# Patient Record
Sex: MALE | Race: WHITE | NOT HISPANIC OR LATINO
[De-identification: names, ages, dates, MRNs, and addresses within clinical notes are randomized per-mention and may not be internally consistent; named-entity substitution may affect disease eponyms.]

---

## 2020-02-19 PROBLEM — Z00.00 ENCOUNTER FOR PREVENTIVE HEALTH EXAMINATION: Status: ACTIVE | Noted: 2020-02-19

## 2020-02-24 ENCOUNTER — APPOINTMENT (OUTPATIENT)
Dept: RADIATION ONCOLOGY | Facility: CLINIC | Age: 75
End: 2020-02-24
Payer: MEDICARE

## 2020-02-24 VITALS
HEART RATE: 70 BPM | HEIGHT: 68 IN | OXYGEN SATURATION: 99 % | SYSTOLIC BLOOD PRESSURE: 140 MMHG | TEMPERATURE: 98 F | DIASTOLIC BLOOD PRESSURE: 78 MMHG | WEIGHT: 214 LBS | BODY MASS INDEX: 32.43 KG/M2 | RESPIRATION RATE: 12 BRPM

## 2020-02-24 DIAGNOSIS — Z80.41 FAMILY HISTORY OF MALIGNANT NEOPLASM OF OVARY: ICD-10-CM

## 2020-02-24 DIAGNOSIS — Z85.841 PERSONAL HISTORY OF MALIGNANT NEOPLASM OF BRAIN: ICD-10-CM

## 2020-02-24 DIAGNOSIS — I97.110: ICD-10-CM

## 2020-02-24 DIAGNOSIS — Z78.9 OTHER SPECIFIED HEALTH STATUS: ICD-10-CM

## 2020-02-24 DIAGNOSIS — Z87.438 PERSONAL HISTORY OF OTHER DISEASES OF MALE GENITAL ORGANS: ICD-10-CM

## 2020-02-24 DIAGNOSIS — Z87.891 PERSONAL HISTORY OF NICOTINE DEPENDENCE: ICD-10-CM

## 2020-02-24 DIAGNOSIS — Z87.11 PERSONAL HISTORY OF PEPTIC ULCER DISEASE: ICD-10-CM

## 2020-02-24 PROCEDURE — 99204 OFFICE O/P NEW MOD 45 MIN: CPT | Mod: 25,GC

## 2020-02-24 RX ORDER — TAMSULOSIN HYDROCHLORIDE 0.4 MG/1
0.4 CAPSULE ORAL
Refills: 0 | Status: ACTIVE | COMMUNITY

## 2020-02-24 NOTE — PHYSICAL EXAM
[Normal] : normal skin color and pigmentation and no rash [de-identified] : cranial nerves intact except for bilateral hearing loss, tremors bilaterally L>R, some instability with walking

## 2020-02-24 NOTE — VITALS
[Maximal Pain Intensity: 0/10] : 0/10 [Least Pain Intensity: 0/10] : 0/10 [NoTreatment Scheduled] : no treatment scheduled [80: Normal activity with effort; some signs or symptoms of disease.] : 80: Normal activity with effort; some signs or symptoms of disease.  [80: Active, but tires more quickly] : 80: Active, but tires more quickly [ECOG Performance Status: 2 - Ambulatory and capable of all self care but unable to carry out any work activities] : Performance Status: 2 - Ambulatory and capable of all self care but unable to carry out any work activities. Up and about more than 50% of waking hours [Date: ____________] : Patient's last distress assessment performed on [unfilled]. [0 - No Distress] : Distress Level: 0

## 2020-02-24 NOTE — HISTORY OF PRESENT ILLNESS
[FreeTextEntry1] : Mr. 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 Nir Swanson and Dr. Brito- [facility]\par \par 3/23/17- 2nd craniotomy to relieve pressure ans swelling from the first craniotomy\par \par 6/29/17- Dr. Cooney consult\par  metastatic renal cell carcinoma to brain. He is status post prior craniotomy in March 2017 and\par has opted to continue with observation to the tumor bed in lieu of radiation\par therapy to reduce his risks of recurrence in this location. We have briefly\par discussed the role of hypofractionated radiation therapy to encompass the\par cavity to maximize local control in this area. They understand that the benefit\par would be for local control only; however, as of yet there is no survival\par benefit by adding radiation therapy at this time. They favor continued observation. \par We have also discussed the role of gamma knife radiosurgery targeting his 8.0 mm \par right cerebellar lesion.\par \par 7/20/17- Gamma knife #1- Dr. Cooney & Dr. Thompson \par Using the gamma plan software, the optimal isodose\par plan and configuration was established for the treatment of the right\par cerebellar metastasis that was confined to the tonsil. Careful attention was\par paid to avoiding any radiation to the adjacent brainstem. Upon approval of the\par plan by the radiation oncologist, DARÍO CONOEY MD, the patient was brought into\par the treatment area where he underwent the gamma knife stereotactic\par radiosurgical intervention in an uneventful fashion. A prescription dose of 20\par Gy was delivered to the edge of the metastasis coincident with the 50% isodose\par line. \par \par 1/18/18- Dr. Cooney - pt presents with follow-up MRI of the brain revealing a new left sided cerebellar lesion\par \par 2/1/18- Gamma knife #2- Dr. Streeter & Dr. Thompson\elvira n the meantime the data was analyzed in the dosimetry room where the left\par cerebellar metastasis was identified. A treatment plan was then developed to\par perform a highly conformal outline of the tumor using a combination of an 8 mm\par and 4 mm collimator helmet with different gamma angles. Upon approval of the\par plan by the radiation oncologist, ROLLY STREETER M.D., the patient was brought\par into the treatment area where he underwent the gamma knife stereotactic\par radiosurgical intervention in an uneventful fashion. A prescription dose of 20\par Gy was delivered to the 50 percent isodose line at the edge of the tumor. \par \par 5/18/18- Dr. Cooney- He has had 2 prior excellent responses to Gamma Knife radiosurgery and\par he now presents with a small 1 cm tumor deposit at the superior aspect of his\par right temporal craniotomy bed. \par \par 5/31/18- Gamma knife #3- Dr. Cooney & Dr. Thompson\par Using the GammaPlan software, the optimal isodose\par plan and configuration was utilized to create a complex series of shots to\par cover the right temporoparietal metastasis, 16 mm, two 8 mm, and two 4 mm shots\par being utilized.\par On approval of the plan by the radiation oncologist, DARÍO COONEY MD, the\par patient was brought into the treatment area where he underwent the Gamma Knife\par stereotactic radiosurgical intervention in an uneventful fashion. A\par prescription dose of 20 Gy was delivered to the tumor coincident with a 50\par percent isodose line placed immediately outside the edge of the\par contrast-enhancing area noted on the MRI scan.\par \par 12/14/18- Dr. Thompson f/u- Stated the 3 lesions remain very similar in size, a small amount of peritumoral edema being seen in the lesion that is adjacent to the fourth ventricle. HE stated he saw no evidence of any new lesions within the brain. \par \par 1/2/2020- Dr. Dave Silver- Pt had left partial adrenalectomy- at [facility] in [place] \par \par Dr. Elizabeth Ayon Neuro oncologist was following the pt for a while, Dr. Ayon left the practice and Dr. Nedra Del Toro- Neuro oncologist- at [facility] at the [facility]. MRI continued q 3 months, no new results/lesions up until 2/2020. \par \par \par MRI - 2/11/20- Unchanged right temporoparietal resection cavity ans adjacent edema/gliosis. Nodular enhancement along the superior margin is unchanged at 1.5 x 0.8 cm.\par Unchanged left cerebellar enhancing lesions measuring up to 0.5 x 0.5 cm\par Unchanged inferior right cerebellar enhancing lesion 0.7 x 0.7cm\par Increased left cerebellar lesion at 0.8 x 0.6 cm previously 0.4 x 0.2 cm. \par \par Hemianopsia- visual field cut bilateral eyes on the left eyes\par \par Has some hearing loss bilaterally and tremors but otherwise feels well since his surgery last month.\par \par Global fatigue score - 0\par Fatigue interference score -0\par Fatigue severity- 0

## 2020-02-24 NOTE — REASON FOR VISIT
[Consideration for Non-Curative Therapy] : consideration for non-curative therapy for [Brain Metastasis] : brain metastasis [Spouse] : spouse

## 2020-02-24 NOTE — REVIEW OF SYSTEMS
[Urinary Frequency: Grade 1 - Present] : Urinary Frequency: Grade 1 - Present [Blurred Vision: Grade 0] : Blurred Vision: Grade 0 [Tinnitus: Grade 1 - Mild symptoms; intervention not indicated] : Tinnitus: Grade 1 - Mild symptoms; intervention not indicated [Xerostomia: Grade 0] : Xerostomia: Grade 0 [Mucositis Oral: Grade 0] : Mucositis Oral: Grade 0  [Salivary duct inflammation: Grade 0] : Salivary duct inflammation: Grade 0 [Oral Pain: Grade 0] : Oral Pain: Grade 0 [Dysgeusia: Grade 0] : Dysgeusia: Grade 0 [Concentration Impairment: Grade 1] : Concentration Impairment: Grade 1 - Mild inattention or decreased level of concentration [Cognitive Disturbance: Grade 1 - Mild cognitive disability; not interfering with work/school/life performance; specialized educational services/devices not indicated] : Cognitive Disturbance: Grade 1 - Mild cognitive disability; not interfering with work/school/life performance; specialized educational services/devices not indicated [Facial Muscle Weakness: Grade 0] : Facial Muscle Weakness: Grade 0 [Dizziness: Grade 0] : Dizziness: Grade 0  [Headache: Grade 0] : Headache: Grade 0 [Lethargy: Grade 0] : Lethargy: Grade 0 [Peripheral Motor Neuropathy: Grade 0] : Peripheral Motor Neuropathy: Grade 0 [Peripheral Sensory Neuropathy: Grade 0] : Peripheral Sensory Neuropathy: Grade 0 [Insomnia: Grade 0] : Insomnia: Grade 0 [Anxiety: Grade 0] : Anxiety: Grade 0  [Depression: Grade 0] : Depression: Grade 0 [Negative] : Heme/Lymph [Fatigue: Grade 0] : Fatigue: Grade 0 [FreeTextEntry3] : hemianopsia [de-identified] : tremors, imbalance [FreeTextEntry9] : nocturia x1 [FreeTextEntry1] : occasional  long term memory loss,  [FreeTextEntry2] : occasional unsteady gait, \par Hemianopsia- visual field cut bilateral eyes on the left eyes

## 2020-06-04 ENCOUNTER — APPOINTMENT (OUTPATIENT)
Dept: RADIATION ONCOLOGY | Facility: CLINIC | Age: 75
End: 2020-06-04
Payer: MEDICARE

## 2020-06-04 VITALS
HEART RATE: 67 BPM | OXYGEN SATURATION: 98 % | HEIGHT: 68 IN | TEMPERATURE: 97 F | DIASTOLIC BLOOD PRESSURE: 74 MMHG | RESPIRATION RATE: 14 BRPM | BODY MASS INDEX: 32.58 KG/M2 | WEIGHT: 215 LBS | SYSTOLIC BLOOD PRESSURE: 138 MMHG

## 2020-06-04 PROCEDURE — 99024 POSTOP FOLLOW-UP VISIT: CPT

## 2020-06-04 RX ORDER — DEXAMETHASONE 4 MG/1
4 TABLET ORAL DAILY
Qty: 4 | Refills: 0 | Status: DISCONTINUED | COMMUNITY
Start: 2020-02-27 | End: 2020-06-04

## 2020-06-04 NOTE — HISTORY OF PRESENT ILLNESS
[FreeTextEntry1] : Mr. Martines is a 75-year-old  male with metastatic renal cell carcinoma, status post three gamma knife procedures over\par the last two years, who presented with a new lesion involving medial inferior left cerebellar region and was referred for stereotactic radiosurgery. \par \par 2/27/20 -  Gamma knife - Dr. Bates & Dr. Thompson- A dose of 24 Gy was prescribed to the 50% isodose\par line to the lesion located in the left medial inferior cerebellar region.\par \par 6/4/20- Follow up post gamma knife, MRI was done here today at [facility]. Pt states he is feeling well. C/O occasionally bilateral tinnitus, [place] bilaterally- wears hearing aide bilaterally- unchanged. Occasional unsteady gait, walking without assistive device, not driving. C/O long term memory loss, unchanged. \par Denies headache nausea or vomiting.\par \par Patient was seen recently at [facility].  Patient had surgery for his metastatic disease in his adrenal gland.  Patient is being reviewed for systemic chemotherapy.\par \par

## 2020-06-04 NOTE — VITALS
[Least Pain Intensity: 0/10] : 0/10 [Maximal Pain Intensity: 0/10] : 0/10 [80: Active, but tires more quickly] : 80: Active, but tires more quickly [80: Normal activity with effort; some signs or symptoms of disease.] : 80: Normal activity with effort; some signs or symptoms of disease.  [ECOG Performance Status: 2 - Ambulatory and capable of all self care but unable to carry out any work activities] : Performance Status: 2 - Ambulatory and capable of all self care but unable to carry out any work activities. Up and about more than 50% of waking hours

## 2020-06-04 NOTE — PHYSICAL EXAM
[No Focal Deficits] : no focal deficits [Sensation] : the sensory exam was normal to light touch and pinprick [Motor Exam] : the motor exam was normal [Normal] : oriented to person, place and time, the affect was normal, the mood was normal and not anxious [de-identified] : No cranial nerve deficits noted.  No cerebellar signs.

## 2020-06-04 NOTE — REVIEW OF SYSTEMS
[Fatigue: Grade 0] : Fatigue: Grade 0 [Tinnitus: Grade 1 - Mild symptoms; intervention not indicated] : Tinnitus: Grade 1 - Mild symptoms; intervention not indicated [Blurred Vision: Grade 0] : Blurred Vision: Grade 0 [Mucositis Oral: Grade 0] : Mucositis Oral: Grade 0  [Xerostomia: Grade 0] : Xerostomia: Grade 0 [Oral Pain: Grade 0] : Oral Pain: Grade 0 [Salivary duct inflammation: Grade 0] : Salivary duct inflammation: Grade 0 [Dysgeusia: Grade 0] : Dysgeusia: Grade 0 [Cognitive Disturbance: Grade 1 - Mild cognitive disability; not interfering with work/school/life performance; specialized educational services/devices not indicated] : Cognitive Disturbance: Grade 1 - Mild cognitive disability; not interfering with work/school/life performance; specialized educational services/devices not indicated [Concentration Impairment: Grade 1] : Concentration Impairment: Grade 1 - Mild inattention or decreased level of concentration [Dizziness: Grade 0] : Dizziness: Grade 0  [Facial Muscle Weakness: Grade 0] : Facial Muscle Weakness: Grade 0 [Headache: Grade 0] : Headache: Grade 0 [Lethargy: Grade 0] : Lethargy: Grade 0 [Meningismus: Grade 0] : Meningismus: Grade 0 [Peripheral Motor Neuropathy: Grade 0] : Peripheral Motor Neuropathy: Grade 0 [Peripheral Sensory Neuropathy: Grade 0] : Peripheral Sensory Neuropathy: Grade 0 [Somnolence: Grade 0] : Somnolence: Grade 0 [Anxiety: Grade 0] : Anxiety: Grade 0  [Insomnia: Grade 0] : Insomnia: Grade 0 [Depression: Grade 0] : Depression: Grade 0 [FreeTextEntry2] : Occasional unsteady gait, walking without assistive device\par Hemianopsia- visual field cut bilateral eyes on the left eyes [FreeTextEntry1] : occasional  long term memory loss

## 2020-09-21 ENCOUNTER — APPOINTMENT (OUTPATIENT)
Dept: RADIATION ONCOLOGY | Facility: CLINIC | Age: 75
End: 2020-09-21
Payer: MEDICARE

## 2020-09-21 VITALS
DIASTOLIC BLOOD PRESSURE: 78 MMHG | RESPIRATION RATE: 12 BRPM | WEIGHT: 224 LBS | HEIGHT: 68 IN | TEMPERATURE: 98 F | BODY MASS INDEX: 33.95 KG/M2 | OXYGEN SATURATION: 99 % | SYSTOLIC BLOOD PRESSURE: 120 MMHG | HEART RATE: 55 BPM

## 2020-09-21 DIAGNOSIS — C79.31 SECONDARY MALIGNANT NEOPLASM OF BRAIN: ICD-10-CM

## 2020-09-21 DIAGNOSIS — C80.1 SECONDARY MALIGNANT NEOPLASM OF BRAIN: ICD-10-CM

## 2020-09-21 DIAGNOSIS — C64.9 MALIGNANT NEOPLASM OF UNSPECIFIED KIDNEY, EXCEPT RENAL PELVIS: ICD-10-CM

## 2020-09-21 PROCEDURE — 99215 OFFICE O/P EST HI 40 MIN: CPT

## 2020-09-21 RX ORDER — LEVETIRACETAM 500 MG/1
500 TABLET, FILM COATED ORAL
Refills: 0 | Status: ACTIVE | COMMUNITY

## 2020-09-21 NOTE — PHYSICAL EXAM
[No Focal Deficits] : no focal deficits [Sensation] : the sensory exam was normal to light touch and pinprick [Motor Exam] : the motor exam was normal [Normal] : oriented to person, place and time, the affect was normal, the mood was normal and not anxious [de-identified] : No cranial nerve deficits noted.  No cerebellar signs

## 2020-09-21 NOTE — REVIEW OF SYSTEMS
[Fatigue: Grade 0] : Fatigue: Grade 0 [Tinnitus - Grade 0] : Tinnitus - Grade 0 [Blurred Vision: Grade 1 - Intervention not indicated] : Blurred Vision: Grade 1 - Intervention not indicated [Mucositis Oral: Grade 0] : Mucositis Oral: Grade 0  [Xerostomia: Grade 0] : Xerostomia: Grade 0 [Oral Pain: Grade 0] : Oral Pain: Grade 0 [Salivary duct inflammation: Grade 0] : Salivary duct inflammation: Grade 0 [Dysgeusia: Grade 0] : Dysgeusia: Grade 0 [Cognitive Disturbance: Grade 1 - Mild cognitive disability; not interfering with work/school/life performance; specialized educational services/devices not indicated] : Cognitive Disturbance: Grade 1 - Mild cognitive disability; not interfering with work/school/life performance; specialized educational services/devices not indicated [Concentration Impairment: Grade 1] : Concentration Impairment: Grade 1 - Mild inattention or decreased level of concentration [Dizziness: Grade 0] : Dizziness: Grade 0  [Facial Muscle Weakness: Grade 0] : Facial Muscle Weakness: Grade 0 [Headache: Grade 0] : Headache: Grade 0 [Lethargy: Grade 0] : Lethargy: Grade 0 [Meningismus: Grade 0] : Meningismus: Grade 0 [Peripheral Motor Neuropathy: Grade 1 - Asymptomatic; clinical or diagnostic observations only; intervention not indicated] : Peripheral Motor Neuropathy: Grade 1 - Asymptomatic; clinical or diagnostic observations only; intervention not indicated [Peripheral Sensory Neuropathy: Grade 1 - Asymptomatic; loss of deep tendon reflexes or paresthesia] : Peripheral Sensory Neuropathy: Grade 1 - Asymptomatic; loss of deep tendon reflexes or paresthesia [Somnolence: Grade 0] : Somnolence: Grade 0 [Anxiety: Grade 0] : Anxiety: Grade 0  [Depression: Grade 0] : Depression: Grade 0 [Insomnia: Grade 0] : Insomnia: Grade 0 [de-identified] : c/o weaker eyes [FreeTextEntry1] : occasional  short term memory loss [FreeTextEntry2] : Occasional unsteady gait, walking without assistive device\par Hemianopsia- visual field cut bilateral eyes on the left eyes

## 2020-09-21 NOTE — HISTORY OF PRESENT ILLNESS
[FreeTextEntry1] : Mr. Martines is a 75 year old man with metastatic right-sided renal cell carcinoma to the brain, s/p 2 cranitomites, and 4 gamma knife procedures. He was diagnosed in October 2013 with right-sided renal cell carcinoma, and brain metastasis was found secondary to renal cell carcinoma in 2017. \par  \par 3/2/17 - Craniotomy- Dr. Nir Swanson and Dr. Brito- [facility]\par 3/23/17- 2nd craniotomy to relieve pressure ans swelling from the first craniotomy\par \par 6/29/17- Dr. Cooney consult\par Metastatic renal cell carcinoma to brain. He is status post prior craniotomy in March 2017 and\par has opted to continue with observation to the tumor bed in lieu of radiation therapy to reduce his risks of recurrence in this location. We have briefly discussed the role of hypofractionated radiation therapy to encompass the\par cavity to maximize local control in this area. They understand that the benefit would be for local control only; however, as of yet there is no survival benefit by adding radiation therapy at this time. They favor continued observation. \par We have also discussed the role of gamma knife radiosurgery targeting his 8.0 mm right cerebellar lesion.\par \par 7/20/17- Gamma knife #1- Dr. Cooney & Dr. Thompson \par A prescription dose of 20 Gy was delivered to the edge of the metastasis coincident with the 50% isodose line for the treatment of the right cerebellar metastasis that was confined to the tonsil. Careful attention was paid to avoiding any radiation to the adjacent brainstem. \par \par 1/18/18- Dr. Cooney - Patient presents with follow-up MRI of the brain revealing a new left sided cerebellar lesion\par \par 2/1/18- Gamma knife #2- Dr. Streeter & Dr. Thompson. A prescription dose of 20 Gy was delivered to the 50 percent isodose line at the edge of the tumor, the left cerebellar metastasis was identified. \par \par 5/18/18- Dr. Cooney- He has had 2 prior excellent responses to Gamma Knife radiosurgery and he now presents with a small 1 cm tumor deposit at the superior aspect of his right temporal craniotomy bed. \par \par 5/31/18- Gamma knife #3- Dr. Cooney & Dr. Thompson. A prescription dose of 20 Gy was delivered to the tumor coincident with a 50\par percent isodose line placed immediately outside the edge of the contrast-enhancing area noted on the MRI scan, to the right temporoparietal metastasis.\par \par 12/14/18- Dr. Thompson f/u- Stated the 3 lesions remain very similar in size, a small amount of peritumoral edema being seen in the lesion that is adjacent to the fourth ventricle. HE stated he saw no evidence of any new lesions within the brain. \par \par 1/2/2020- Dr. Dave Silver- Pt had left partial adrenal[place]- [place] [facility] [place] \par \par Dr. Elizabeth Ayon Neuro oncologist was following the pt for a while, Dr. Ayon left the practice and Dr. Nedra Del Toro- Neuro oncologist- [place] [facility]. MRI continued q 3 months, no new results/lesions up until 2/2020. \par \par MRI - 2/11/20- Unchanged right temporoparietal resection cavity ans adjacent edema/gliosis. Nodular enhancement along the superior margin is unchanged at 1.5 x 0.8 cm.\par Unchanged left cerebellar enhancing lesions measuring up to 0.5 x 0.5 cm\par Unchanged inferior right cerebellar enhancing lesion 0.7 x 0.7cm\par Increased left cerebellar lesion at 0.8 x 0.6 cm previously 0.4 x 0.2 cm. \par \par Hemianopsia- visual field cut bilateral eyes on the left eyes\par Hearing loss bilaterally and tremors but otherwise feels well since his surgery last month.\par \par On 2/27/20 he had a gamma knife with Dr. Bates & Dr. Thompson.  A dose of 24 Gy was prescribed to the 50% isodose\par line to the lesion located in the left medial inferior cerebellar region.\par \par Patient was last seen in our office on 6/4/20 and at that time he was post gamma knife #4. At that time he stated he was feeling well. He had c/o bilateral tinnitus, [place] bilaterally, he wears hearing aide bilaterally which is unchanged. Occasional unsteady gait, walking without assistive device, he was not driving. He c/o long term memory loss which was unchanged, and denied headache, nausea or vomiting. At that time patient was seen recently at [facility].  Patient had surgery for his metastatic disease in his adrenal gland.  Patient was being reviewed for systemic chemotherapy. Patient had an excellent response to gamma knife treatment. We reviewed the MRI imaging that showed no residual edema or no additional lesions. We recommended repeating MRI of the brain in 3 months and seeing the patient in follow-up clinic. \par \par He is here today for a follow up visit. He had an MRI on 9/3/2020 of the [place] [facility] which revealed no recurrent or residual disease at the right posterior temporal resection cavity. Previously treated metastasis demonstrates a mixed response with a possible active lesion in the inferior cerebellar vermis and suspicion for progression of a left cerebellar metastasis. He c/o fatigue and easily tiring.He reports left face/neck, left shoulder tingling and Dr. 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 He normally sees Dr. Del Toro in Neurology at [facility]. They met with Dr. Thompson on 9/16/2020, and he is here to follow and up and talk about another gamma knife, which would make #5. Dr. Tj Granado at [facility] spoke the patient about starting immunotherapy, Opdivo, [place] after gamma knife. \par \par Patient denies any history of headache nausea or vomiting.  He denies history of extremity weakness.  His appetite remains good and weight remains stable\par

## 2020-09-21 NOTE — VITALS
[Maximal Pain Intensity: 0/10] : 0/10 [Least Pain Intensity: 0/10] : 0/10 [70: Cares for self; unalbe to carry on normal activity or do active work.] : 70: Cares for self; unable to carry on normal activity or do active work. [80: Active, but tires more quickly] : 80: Active, but tires more quickly [ECOG Performance Status: 2 - Ambulatory and capable of all self care but unable to carry out any work activities] : Performance Status: 2 - Ambulatory and capable of all self care but unable to carry out any work activities. Up and about more than 50% of waking hours

## 2020-10-02 RX ORDER — DEXAMETHASONE 4 MG/1
4 TABLET ORAL
Qty: 30 | Refills: 1 | Status: ACTIVE | COMMUNITY
Start: 2020-10-02 | End: 1900-01-01

## 2020-12-10 ENCOUNTER — APPOINTMENT (OUTPATIENT)
Dept: RADIATION ONCOLOGY | Facility: CLINIC | Age: 75
End: 2020-12-10